# Patient Record
Sex: MALE | Race: WHITE | NOT HISPANIC OR LATINO | Employment: FULL TIME | URBAN - METROPOLITAN AREA
[De-identification: names, ages, dates, MRNs, and addresses within clinical notes are randomized per-mention and may not be internally consistent; named-entity substitution may affect disease eponyms.]

---

## 2022-11-12 ENCOUNTER — OFFICE VISIT (OUTPATIENT)
Dept: URGENT CARE | Facility: URGENT CARE | Age: 37
End: 2022-11-12
Payer: COMMERCIAL

## 2022-11-12 VITALS
SYSTOLIC BLOOD PRESSURE: 130 MMHG | WEIGHT: 156 LBS | TEMPERATURE: 97.6 F | DIASTOLIC BLOOD PRESSURE: 89 MMHG | OXYGEN SATURATION: 98 % | HEART RATE: 74 BPM

## 2022-11-12 DIAGNOSIS — H10.022 PINK EYE DISEASE OF LEFT EYE: Primary | ICD-10-CM

## 2022-11-12 PROCEDURE — 99203 OFFICE O/P NEW LOW 30 MIN: CPT

## 2022-11-12 RX ORDER — POLYMYXIN B SULFATE AND TRIMETHOPRIM 1; 10000 MG/ML; [USP'U]/ML
1-2 SOLUTION OPHTHALMIC EVERY 6 HOURS
Qty: 10 ML | Refills: 0 | Status: SHIPPED | OUTPATIENT
Start: 2022-11-12 | End: 2023-02-10

## 2022-11-12 NOTE — PROGRESS NOTES
Assessment & Plan     Pink eye disease of left eye    - trimethoprim-polymyxin b (POLYTRIM) 31904-9.1 UNIT/ML-% ophthalmic solution; Place 1-2 drops Into the left eye every 6 hours    Treat with Polytrim eye drops.  Good handwashing to prevent spread.  Close Follow-up if no change or new or worsening sx prn.    Kimi Bhagat MD  [facility] SAMARA Herrera is a 37 year old, presenting for the following health issues:  Urgent Care and Eye Problem (Left eye red, crusty, pain.)      HPI   Work up with injected LEFT eye crusted over this AM.  3 y/o daughter at home and new baby boy expected in 2 weeks.  No URI sx.  No vision changes.        Review of Systems   Constitutional, HEENT, cardiovascular, pulmonary, GI, , musculoskeletal, neuro, skin, endocrine and psych systems are negative, except as otherwise noted.      Objective    /89   Pulse 74   Temp 97.6  F (36.4  C) (Tympanic)   Wt 70.8 kg (156 lb)   SpO2 98%   There is no height or weight on file to calculate BMI.  Physical Exam   GENERAL: healthy, alert and no distress  EYES: LEFT sclerae and conjunctivae injected with clear drainage noted.  RIGHT eye clear.  MS: no gross musculoskeletal defects noted, no edema  SKIN: no suspicious lesions or rashes  PSYCH: mentation appears normal, affect normal/bright

## 2023-02-06 NOTE — TELEPHONE ENCOUNTER
Action 23 MMT   Action Taken  All records available in Epic.       MEDICAL RECORDS REQUEST   [facility]  [address] [place] [address]  PHONE: [phone]  Fax: [phone]        FUTURE VISIT INFORMATION                                                   Javier Pepe, : 1985 scheduled for future visit at [facility]    APPOINTMENT INFORMATION:    Date: 23    Provider:  Dr. Alfonso Juarez    Reason for Visit/Diagnosis: Vas consult    REFERRAL INFORMATION: Self Referred      RECORDS REQUESTED FOR VISIT                                                     NOTES  STATUS/DETAILS   MEDICATION LIST  yes     PRE-VISIT CHECKLIST      Record collection complete Yes   Appointment appropriately scheduled           (right time/right provider) Yes   Joint diagnostic appointment coordinated correctly          (ensure right order & amount of time) Yes   MyChart activation No, in process   Questionnaire complete No, pending

## 2023-02-10 ENCOUNTER — OFFICE VISIT (OUTPATIENT)
Dept: FAMILY MEDICINE | Facility: CLINIC | Age: 38
End: 2023-02-10
Payer: COMMERCIAL

## 2023-02-10 VITALS
DIASTOLIC BLOOD PRESSURE: 87 MMHG | BODY MASS INDEX: 23.65 KG/M2 | RESPIRATION RATE: 16 BRPM | TEMPERATURE: 99.1 F | HEART RATE: 90 BPM | WEIGHT: 159.7 LBS | OXYGEN SATURATION: 98 % | SYSTOLIC BLOOD PRESSURE: 134 MMHG | HEIGHT: 69 IN

## 2023-02-10 DIAGNOSIS — K11.6 RANULA OF FLOOR OF MOUTH: Primary | ICD-10-CM

## 2023-02-10 PROCEDURE — 99203 OFFICE O/P NEW LOW 30 MIN: CPT | Performed by: PHYSICIAN ASSISTANT

## 2023-02-10 ASSESSMENT — PAIN SCALES - GENERAL: PAINLEVEL: EXTREME PAIN (8)

## 2023-02-10 NOTE — PATIENT INSTRUCTIONS
[facility] [phone] (please check with your insurance first)    Take tylenol as needed for pain up to 1000mg three times daily, do not exceed 3000mg in 24 hour period    Take ibuprofen as needed for pain up to 600mg three times daily with food    Augmentin

## 2023-02-10 NOTE — PROGRESS NOTES
"Assessment and Plan:     (K11.6) Ranula of floor of mouth  (primary encounter diagnosis)  Comment: noticed a few weeks ago, accidentally injured it with tooth a few days ago, now more tender, suspect mild infection, no trouble handling secretions, no e/o jose's angina, deep space abscess   Plan: amoxicillin-clavulanate (AUGMENTIN) 875-125 MG         tablet, Adult ENT  Referral  Will cover w/abx, referral to ENT  Discussed when to be seen promptly       Kalyn Chan PA-C        Subjective   Russell is a 37 year old presenting for the following health issues:  Mouth Problem (Pt noticed \"bubble\" in mouth 2 weeks ago, overnight had began to hurt more )      HPI     Russell noticed a \"bubble\" under his tongue a few weeks ago   It was not painful, just happened to notice it while brushing his teeth    He thinks he bit the area while chewing a few days ago and some fluid came out  Overnight the area became more painful and now looks a bit different   It seems to protrude less but has some areas of redness and is more tender  He feels it most with swallowing/eating and speaking   He had chills last night, he did not check his temp  He denies any difficulty with airway/secretion     Review of Systems   See above      Objective    Wt 72.4 kg (159 lb 11.2 oz)   There is no height or weight on file to calculate BMI.     /87 (BP Location: Left arm, Patient Position: Sitting, Cuff Size: Adult Regular)   Pulse 90   Temp 99.1  F (37.3  C) (Temporal)   Resp 16   Ht 1.753 m (5' 9\")   Wt 72.4 kg (159 lb 11.2 oz)   SpO2 98%   BMI 23.58 kg/m      Physical Exam     EXAM:  GENERAL APPEARANCE: healthy, alert and no distress  EYES: Eyes grossly normal to inspection, conjunctivae and sclerae normal  HENT: ear canals and TMs normal, floor of mouth with erythematous raised lesion, L>R, non-tender, no drainage, floor of mouth is soft, some areas of ulceration on surface of lesion, oropharynx is clear without exudate " or erythema, no tonsillar swelling, airway patent, no drooling  NECK:  Tender adenopathy on left sided submandibular nodes, no swelling, neck is supple  RESP: lungs clear to auscultation - no rales, rhonchi or wheezes  CV: regular rates and rhythm, normal S1 S2, no S3 or S4 and no murmur, click or rub

## 2023-02-14 ENCOUNTER — TELEPHONE (OUTPATIENT)
Dept: OTOLARYNGOLOGY | Facility: CLINIC | Age: 38
End: 2023-02-14
Payer: COMMERCIAL

## 2023-02-14 NOTE — TELEPHONE ENCOUNTER
LVM with message from nurse      It appears that patient was referred to ENT in [place].     If patient wants to see ENT here, patient can be seen by one of the community ENT providers per nurse.       Gave call center number

## 2023-02-14 NOTE — TELEPHONE ENCOUNTER
[facility] Call Center    Phone Message    May a detailed message be left on voicemail: yes     Reason for Call: Question regarding specialist protocol  Please follow protocols- only utilize this documentation for questions or concerns that are not clear in the protocol.  Contact clinic directly to clarify question(s) via phone or [employer] message.   Was Clinic Available: no  Question regarding protocol:  Ranula of floor of mouth [K11.6]  Is there a referral for the requested specialist/specialty? yes  Name of referring provider: Kalyn Lopes PA-C in  [facility]  Location of referring provider: unkown    [facility] on referral.  Patient's dental clinic calling to see if patient can be seen in our clinic.  Please call patient for scheduling.  Thank you!      Action Taken: Message routed to:  [facility]NT    Travel Screening: Not Applicable

## 2023-04-20 ENCOUNTER — PRE VISIT (OUTPATIENT)
Dept: UROLOGY | Facility: CLINIC | Age: 38
End: 2023-04-20

## 2023-04-20 NOTE — TELEPHONE ENCOUNTER
Reason for Visit: Consult for Vasectomy    Contact Patient: n/a    Rooming Requirements: normal      Shira Infante LPN  04/20/23  1:48 PM

## 2023-05-04 ENCOUNTER — PRE VISIT (OUTPATIENT)
Dept: UROLOGY | Facility: CLINIC | Age: 38
End: 2023-05-04